# Patient Record
Sex: FEMALE | Race: WHITE | NOT HISPANIC OR LATINO | Employment: FULL TIME | URBAN - METROPOLITAN AREA
[De-identification: names, ages, dates, MRNs, and addresses within clinical notes are randomized per-mention and may not be internally consistent; named-entity substitution may affect disease eponyms.]

---

## 2023-02-19 PROBLEM — F41.9 ANXIETY: Status: ACTIVE | Noted: 2023-02-19

## 2023-02-19 PROBLEM — M79.89 LEG SWELLING: Status: ACTIVE | Noted: 2023-02-19

## 2023-02-19 PROBLEM — B37.31 VAGINAL YEAST INFECTION: Status: ACTIVE | Noted: 2023-02-19

## 2023-02-19 PROBLEM — E66.9 CLASS 1 OBESITY WITH BODY MASS INDEX (BMI) OF 31.0 TO 31.9 IN ADULT: Status: ACTIVE | Noted: 2023-02-19

## 2023-02-19 RX ORDER — SERTRALINE HYDROCHLORIDE 50 MG/1
1 TABLET, FILM COATED ORAL DAILY
COMMUNITY
Start: 2022-06-23 | End: 2023-03-10 | Stop reason: SDUPTHER

## 2023-02-19 RX ORDER — ETONOGESTREL AND ETHINYL ESTRADIOL VAGINAL RING .015; .12 MG/D; MG/D
1 RING VAGINAL
COMMUNITY
Start: 2022-05-26 | End: 2023-10-31

## 2023-02-19 RX ORDER — IBUPROFEN 600 MG/1
TABLET ORAL
COMMUNITY
Start: 2022-01-21 | End: 2023-10-31

## 2023-02-19 RX ORDER — TIRZEPATIDE 2.5 MG/.5ML
2.5 INJECTION, SOLUTION SUBCUTANEOUS
COMMUNITY
End: 2023-03-10

## 2023-03-10 ENCOUNTER — OFFICE VISIT (OUTPATIENT)
Dept: PRIMARY CARE | Facility: CLINIC | Age: 38
End: 2023-03-10
Payer: COMMERCIAL

## 2023-03-10 VITALS
HEIGHT: 69 IN | HEART RATE: 86 BPM | SYSTOLIC BLOOD PRESSURE: 120 MMHG | RESPIRATION RATE: 14 BRPM | DIASTOLIC BLOOD PRESSURE: 88 MMHG | TEMPERATURE: 98.2 F | BODY MASS INDEX: 29.03 KG/M2 | OXYGEN SATURATION: 98 % | WEIGHT: 196 LBS

## 2023-03-10 DIAGNOSIS — R11.0 NAUSEA: ICD-10-CM

## 2023-03-10 DIAGNOSIS — E66.9 CLASS 1 OBESITY WITHOUT SERIOUS COMORBIDITY WITH BODY MASS INDEX (BMI) OF 31.0 TO 31.9 IN ADULT, UNSPECIFIED OBESITY TYPE: ICD-10-CM

## 2023-03-10 DIAGNOSIS — F41.9 ANXIETY: Primary | ICD-10-CM

## 2023-03-10 PROBLEM — B37.31 VAGINAL YEAST INFECTION: Status: RESOLVED | Noted: 2023-02-19 | Resolved: 2023-03-10

## 2023-03-10 PROCEDURE — 99213 OFFICE O/P EST LOW 20 MIN: CPT | Performed by: INTERNAL MEDICINE

## 2023-03-10 PROCEDURE — 1036F TOBACCO NON-USER: CPT | Performed by: INTERNAL MEDICINE

## 2023-03-10 PROCEDURE — 3008F BODY MASS INDEX DOCD: CPT | Performed by: INTERNAL MEDICINE

## 2023-03-10 RX ORDER — ONDANSETRON 4 MG/1
4 TABLET, FILM COATED ORAL EVERY 8 HOURS PRN
Qty: 20 TABLET | Refills: 0 | Status: SHIPPED | OUTPATIENT
Start: 2023-03-10 | End: 2023-03-17

## 2023-03-10 RX ORDER — SERTRALINE HYDROCHLORIDE 50 MG/1
50 TABLET, FILM COATED ORAL DAILY
Qty: 90 TABLET | Refills: 3 | Status: SHIPPED | OUTPATIENT
Start: 2023-03-10 | End: 2023-05-11 | Stop reason: SDUPTHER

## 2023-03-10 ASSESSMENT — LIFESTYLE VARIABLES: HOW OFTEN DO YOU HAVE A DRINK CONTAINING ALCOHOL: MONTHLY OR LESS

## 2023-03-10 NOTE — PATIENT INSTRUCTIONS
She is doing well.   She has  occasional  nausea . She can try  to inhale rubbing alcohol  Otherwise will call in a few zofran to use as needed  Call  with any problems or questions.   Follow up in 6 months

## 2023-03-10 NOTE — PROGRESS NOTES
"Subjective    Jess Ac is a 37 y.o. female who presents for Weight Loss medication follow up.  HPI  Doing well with victoza. C/o nausea couple times a week.   She has lost 20 lbs since January. She is doing well on it. She took a zofran and that helped.     Review of Systems   All other systems reviewed and are negative.        Objective     /88 (BP Location: Left arm, Patient Position: Sitting, BP Cuff Size: Adult)   Pulse 86   Temp 36.8 °C (98.2 °F)   Resp 14   Ht 1.753 m (5' 9\")   Wt 88.9 kg (196 lb)   SpO2 98%   BMI 28.94 kg/m²    Physical Exam  Vitals reviewed.   Constitutional:       General: She is not in acute distress.     Appearance: Normal appearance.   Cardiovascular:      Rate and Rhythm: Normal rate and regular rhythm.      Pulses: Normal pulses.      Heart sounds: Normal heart sounds.   Pulmonary:      Effort: Pulmonary effort is normal.      Breath sounds: Normal breath sounds.   Abdominal:      Tenderness: There is no abdominal tenderness.   Musculoskeletal:         General: No swelling.   Skin:     General: Skin is warm and dry.   Neurological:      Mental Status: She is alert.       Health Maintenance Due   Topic Date Due    Yearly Adult Physical  Never done    Lipid Panel  Never done    HIV Screening  Never done    MMR Vaccines (1 of 1 - Standard series) Never done    Varicella Vaccines (1 of 2 - 2-dose childhood series) Never done    Hepatitis B Vaccines (2 of 3 - 3-dose series) 02/14/2001    Hepatitis C Screening  Never done    Diabetes Screening  Never done    COVID-19 Vaccine (1) Never done    Cervical Cancer Screening  Never done    DTaP/Tdap/Td Vaccines (2 - Td or Tdap) 11/23/2021    Influenza Vaccine (1) 09/01/2022          Assessment/Plan   Problem List Items Addressed This Visit          Endocrine/Metabolic    Class 1 obesity with body mass index (BMI) of 31.0 to 31.9 in adult    Relevant Medications    liraglutide (Victoza) 0.6 mg/0.1 mL (18 mg/3 mL) injection    " Other Relevant Orders    Follow Up In [facility] - PCP       Other    Anxiety - Primary    Relevant Medications    sertraline (Zoloft) 50 mg tablet     Other Visit Diagnoses       Nausea        Relevant Medications    ondansetron (Zofran) 4 mg tablet

## 2023-04-03 DIAGNOSIS — E66.9 CLASS 1 OBESITY WITHOUT SERIOUS COMORBIDITY WITH BODY MASS INDEX (BMI) OF 31.0 TO 31.9 IN ADULT, UNSPECIFIED OBESITY TYPE: ICD-10-CM

## 2023-04-14 DIAGNOSIS — E66.9 CLASS 1 OBESITY WITHOUT SERIOUS COMORBIDITY WITH BODY MASS INDEX (BMI) OF 31.0 TO 31.9 IN ADULT, UNSPECIFIED OBESITY TYPE: ICD-10-CM

## 2023-04-19 ENCOUNTER — TELEPHONE (OUTPATIENT)
Dept: PRIMARY CARE | Facility: CLINIC | Age: 38
End: 2023-04-19
Payer: COMMERCIAL

## 2023-04-19 NOTE — TELEPHONE ENCOUNTER
mariah Kahn from [facility] lm regarding Victoza    She has  insurance and rx is only covered with DM dx.   --    I will inform patient.

## 2023-05-11 ENCOUNTER — TELEPHONE (OUTPATIENT)
Dept: PRIMARY CARE | Facility: CLINIC | Age: 38
End: 2023-05-11
Payer: COMMERCIAL

## 2023-05-11 DIAGNOSIS — F41.9 ANXIETY: ICD-10-CM

## 2023-05-11 RX ORDER — SERTRALINE HYDROCHLORIDE 100 MG/1
150 TABLET, FILM COATED ORAL DAILY
Qty: 270 TABLET | Refills: 0 | Status: SHIPPED | OUTPATIENT
Start: 2023-05-11 | End: 2023-09-29 | Stop reason: SDUPTHER

## 2023-05-11 NOTE — TELEPHONE ENCOUNTER
----- Message from Marni Sepulveda DO sent at 5/11/2023 12:18 PM EDT -----  Regarding: FW: Zoloft   Contact: [phone]  Have her increase to 1.5 tabs (150mg) and see me in 4-6 weeks. Can send her in a new refil  ----- Message -----  From: Lesly Trent CMA  Sent: 5/11/2023  10:27 AM EDT  To: Marni Sepulveda DO  Subject: FW: Zoloft                                         ----- Message -----  From: Jess Ac  Sent: 5/11/2023  10:25 AM EDT  [facility]  Subject: Zoloft                                           Hello,     I would like to increase my Zoloft please. I am currently at 100mg. Please let me know.     Thank you!

## 2023-06-19 PROBLEM — E66.09 OBESITY DUE TO EXCESS CALORIES: Status: ACTIVE | Noted: 2023-06-19

## 2023-06-19 PROBLEM — F41.9 ANXIETY: Status: ACTIVE | Noted: 2023-06-19

## 2023-09-29 DIAGNOSIS — F41.9 ANXIETY: ICD-10-CM

## 2023-09-29 RX ORDER — SERTRALINE HYDROCHLORIDE 100 MG/1
150 TABLET, FILM COATED ORAL DAILY
Qty: 270 TABLET | Refills: 0 | Status: SHIPPED | OUTPATIENT
Start: 2023-09-29 | End: 2023-10-31

## 2023-10-23 ENCOUNTER — APPOINTMENT (OUTPATIENT)
Dept: PRIMARY CARE | Facility: CLINIC | Age: 38
End: 2023-10-23
Payer: COMMERCIAL

## 2023-10-30 ENCOUNTER — TELEPHONE (OUTPATIENT)
Dept: PRIMARY CARE | Facility: CLINIC | Age: 38
End: 2023-10-30
Payer: COMMERCIAL

## 2023-10-30 ENCOUNTER — TELEPHONE (OUTPATIENT)
Dept: OBSTETRICS AND GYNECOLOGY | Facility: CLINIC | Age: 38
End: 2023-10-30

## 2023-10-30 DIAGNOSIS — Z34.90 PREGNANCY, UNSPECIFIED GESTATIONAL AGE (HHS-HCC): Primary | ICD-10-CM

## 2023-10-30 DIAGNOSIS — O36.80X0 PREGNANCY OF UNKNOWN ANATOMIC LOCATION (HHS-HCC): Primary | ICD-10-CM

## 2023-10-30 DIAGNOSIS — Z87.59 HISTORY OF MISCARRIAGE: ICD-10-CM

## 2023-10-30 NOTE — TELEPHONE ENCOUNTER
NOB called ob line stating that she has a history of 6 prior miscarriages and with her last pregnancy she was started on progesterone suppositories.  She was advised to call to notify her OB of +upt so that she could be started on progesterone ASAP.  Due to patients insurance change she can no longer see her prior ob at [facility] and is hoping that she can have the progesterone suppositories prescribed ASAP so that she doesn't have another miscarriage.  Patient advised that we cannot prescribe anything until she has established here as a patient.  Patient offered an earlier appointment with Gloria tomorrow at 1 pm in [place], which she accepted.  Message forwarded to Gloria to make her aware

## 2023-10-31 ENCOUNTER — OFFICE VISIT (OUTPATIENT)
Dept: OBSTETRICS AND GYNECOLOGY | Facility: CLINIC | Age: 38
End: 2023-10-31
Payer: COMMERCIAL

## 2023-10-31 VITALS
BODY MASS INDEX: 26.27 KG/M2 | HEIGHT: 69 IN | WEIGHT: 177.38 LBS | DIASTOLIC BLOOD PRESSURE: 70 MMHG | SYSTOLIC BLOOD PRESSURE: 108 MMHG

## 2023-10-31 DIAGNOSIS — Z34.90 PREGNANCY, UNSPECIFIED GESTATIONAL AGE (HHS-HCC): Primary | ICD-10-CM

## 2023-10-31 DIAGNOSIS — Z87.59 HISTORY OF MISCARRIAGE: ICD-10-CM

## 2023-10-31 PROBLEM — F41.9 ANXIETY: Status: ACTIVE | Noted: 2023-02-19

## 2023-10-31 LAB — B-HCG SERPL-ACNC: 56 MIU/ML

## 2023-10-31 PROCEDURE — 1036F TOBACCO NON-USER: CPT | Performed by: ADVANCED PRACTICE MIDWIFE

## 2023-10-31 PROCEDURE — 84702 CHORIONIC GONADOTROPIN TEST: CPT

## 2023-10-31 PROCEDURE — 36415 COLL VENOUS BLD VENIPUNCTURE: CPT

## 2023-10-31 PROCEDURE — 99203 OFFICE O/P NEW LOW 30 MIN: CPT | Performed by: ADVANCED PRACTICE MIDWIFE

## 2023-10-31 PROCEDURE — 3008F BODY MASS INDEX DOCD: CPT | Performed by: ADVANCED PRACTICE MIDWIFE

## 2023-10-31 RX ORDER — PROGESTERONE 200 MG/1
CAPSULE ORAL
COMMUNITY
Start: 2023-10-31 | End: 2023-10-31

## 2023-10-31 RX ORDER — PROGESTERONE 200 MG/1
200 CAPSULE ORAL NIGHTLY
Qty: 30 CAPSULE | Refills: 2 | Status: SHIPPED | OUTPATIENT
Start: 2023-10-31 | End: 2023-12-07 | Stop reason: ALTCHOICE

## 2023-10-31 RX ORDER — ASPIRIN 81 MG/1
81 TABLET ORAL DAILY
COMMUNITY
End: 2023-12-07 | Stop reason: ALTCHOICE

## 2023-10-31 ASSESSMENT — ENCOUNTER SYMPTOMS
RESPIRATORY NEGATIVE: 0
MUSCULOSKELETAL NEGATIVE: 0
PSYCHIATRIC NEGATIVE: 0
HEMATOLOGIC/LYMPHATIC NEGATIVE: 0
CARDIOVASCULAR NEGATIVE: 0
NEUROLOGICAL NEGATIVE: 0
CONSTITUTIONAL NEGATIVE: 0
EYES NEGATIVE: 0
ENDOCRINE NEGATIVE: 0
GASTROINTESTINAL NEGATIVE: 0
ALLERGIC/IMMUNOLOGIC NEGATIVE: 0

## 2023-10-31 NOTE — PROGRESS NOTES
"Subjective   Patient ID: Jess Ac is a 37 y.o. female who presents to establish care and seek rx for progesterone PV for hx of pregnancy loss. Feeling well today, and optimistic.  LMP 9/29 and had spotting 10/24 and 10/25, which was not consistent with her general bleeding pattern. 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  Living with , step son and daughter - mom is moving in with them soon.     Review of Systems   All other systems reviewed and are negative.      Objective   Physical Exam  Vitals reviewed.   Constitutional:       Appearance: Normal appearance.   HENT:      Head: Normocephalic and atraumatic.   Cardiovascular:      Rate and Rhythm: Normal rate.   Pulmonary:      Effort: Pulmonary effort is normal.   Skin:     General: Skin is warm and dry.   Neurological:      Mental Status: She is alert and oriented to person, place, and time.   Psychiatric:         Mood and Affect: Mood normal.         Behavior: Behavior normal.         Assessment/Plan   Problem List Items Addressed This Visit    None  Visit Diagnoses         Codes    Pregnancy, unspecified gestational age     Z34.90    Relevant Orders    US MAC OB imaging order    POCT pregnancy, urine manually resulted    History of miscarriage     Z87.59    Relevant Medications    progesterone (Prometrium) 200 mg capsule    Other Relevant Orders    US MAC OB imaging order    hCG, quantitative (Completed)    US MAC OB imaging order    POCT pregnancy, urine manually resulted          Patient desires supplemental vaginal progesterone therapy in the setting of repeated pregnancy loss: discussed that this therapy is not supported by data, and that trials of patients with recurrent pregnancy loss showed no differences between the groups in the rates of clinical " pregnancy at six to eight weeks, ongoing pregnancy at 12 weeks, ectopic pregnancy, miscarriage, or stillbirth. There were also no differences in  outcomes.  Quants ordered.  Early ultrasound ordered - will do after 6-7 weeks.  Continue ASA, PNV and start PV progesterone now.   All questions answered.     DESEAN Jason-TAMMY

## 2023-11-02 ENCOUNTER — APPOINTMENT (OUTPATIENT)
Dept: OBSTETRICS AND GYNECOLOGY | Facility: CLINIC | Age: 38
End: 2023-11-02
Payer: COMMERCIAL

## 2023-11-02 ENCOUNTER — LAB (OUTPATIENT)
Dept: LAB | Facility: LAB | Age: 38
End: 2023-11-02
Payer: COMMERCIAL

## 2023-11-02 DIAGNOSIS — Z87.59 HISTORY OF MISCARRIAGE: ICD-10-CM

## 2023-11-02 DIAGNOSIS — Z34.90 PREGNANCY, UNSPECIFIED GESTATIONAL AGE (HHS-HCC): ICD-10-CM

## 2023-11-02 LAB — B-HCG SERPL-ACNC: 163 MIU/ML

## 2023-11-02 PROCEDURE — 84702 CHORIONIC GONADOTROPIN TEST: CPT

## 2023-11-02 PROCEDURE — 36415 COLL VENOUS BLD VENIPUNCTURE: CPT

## 2023-11-06 ENCOUNTER — APPOINTMENT (OUTPATIENT)
Dept: OBSTETRICS AND GYNECOLOGY | Facility: CLINIC | Age: 38
End: 2023-11-06
Payer: COMMERCIAL

## 2023-11-06 NOTE — TELEPHONE ENCOUNTER
"5.3 wk ob called OB line c/o brown spotting when wiping since Saturday.  It started as a light pink discoloration on the toilet paper when wiping on Saturday morning.  Patient had a very active day, and the spotting seemed to  and was brown in color throughout the day with little \"specs\" of possible clots vs tissue.  Had intercourse Saturday night, continued to have brown spotting all day Sunday, again only when wiping. Today the spotting is still brown in color and seems to be lessoning.  Patient denies cramping or pain.  Due to h/o 6 miscarriages, Gloria started her on Progesterone, which she has been taking since end of October.  BHCG 56 on 10/31 & 163 on 11/3.   Patient is wondering is she can or should have repeat BHCG drawn again being that she is having spotting?  With her history she is very nervous about another potential miscarriage.  Patient is wondering if she is having a miscarriage and is using progesterone will she still be able to pass everything while on progesterone?  Patient assured that progesterone is taken by women with a strong history of recurrent miscarrages to help keep you pregnant but will not prevent a miscarriage from happening.  Patient assured that spotting in first part of pregnancy is not uncommon.  Spotting after intercourse is also not uncommon.  For now patient advised to monitor her spotting and call if she starts bleeding like a period and/or having intense cramping/pain.      Message forwarded to Emanuel being that Gloria is out of the office on Monday's.  Please advise if repeat BHCG's are recommended.  "

## 2023-11-07 DIAGNOSIS — O09.299 HISTORY OF MISCARRIAGE, CURRENTLY PREGNANT (HHS-HCC): ICD-10-CM

## 2023-11-07 DIAGNOSIS — O36.80X0 PREGNANCY WITH UNCERTAIN FETAL VIABILITY, SINGLE OR UNSPECIFIED FETUS (HHS-HCC): ICD-10-CM

## 2023-11-07 DIAGNOSIS — O26.851 SPOTTING AFFECTING PREGNANCY IN FIRST TRIMESTER (HHS-HCC): ICD-10-CM

## 2023-11-07 NOTE — TELEPHONE ENCOUNTER
HCGs were previously increasing appropriately.  At this time point I would recommend an ultrasound for this week.

## 2023-11-08 ENCOUNTER — ANCILLARY PROCEDURE (OUTPATIENT)
Dept: RADIOLOGY | Facility: CLINIC | Age: 38
End: 2023-11-08
Payer: COMMERCIAL

## 2023-11-08 ENCOUNTER — LAB (OUTPATIENT)
Dept: LAB | Facility: LAB | Age: 38
End: 2023-11-08
Payer: COMMERCIAL

## 2023-11-08 DIAGNOSIS — O36.80X0 PREGNANCY WITH UNCERTAIN FETAL VIABILITY, SINGLE OR UNSPECIFIED FETUS (HHS-HCC): ICD-10-CM

## 2023-11-08 DIAGNOSIS — O09.299 HISTORY OF MISCARRIAGE, CURRENTLY PREGNANT (HHS-HCC): ICD-10-CM

## 2023-11-08 DIAGNOSIS — O36.80X0 PREGNANCY OF UNKNOWN ANATOMIC LOCATION (HHS-HCC): ICD-10-CM

## 2023-11-08 DIAGNOSIS — O26.851 SPOTTING AFFECTING PREGNANCY IN FIRST TRIMESTER (HHS-HCC): ICD-10-CM

## 2023-11-08 LAB — B-HCG SERPL-ACNC: 152 MIU/ML

## 2023-11-08 PROCEDURE — 76817 TRANSVAGINAL US OBSTETRIC: CPT | Performed by: OBSTETRICS & GYNECOLOGY

## 2023-11-08 PROCEDURE — 76801 OB US < 14 WKS SINGLE FETUS: CPT

## 2023-11-08 PROCEDURE — 76817 TRANSVAGINAL US OBSTETRIC: CPT

## 2023-11-08 PROCEDURE — 36415 COLL VENOUS BLD VENIPUNCTURE: CPT

## 2023-11-08 PROCEDURE — 76815 OB US LIMITED FETUS(S): CPT | Performed by: OBSTETRICS & GYNECOLOGY

## 2023-11-08 PROCEDURE — 84702 CHORIONIC GONADOTROPIN TEST: CPT

## 2023-11-08 NOTE — TELEPHONE ENCOUNTER
Patient had her early viability ultrasound done today due to h/o multiple MAB's and spotting.  Ultrasound tech was confused as to why the patient was there so early, being that she is only 5.5 wks today and is too early to see fetal structures on ultrasound.  Ultrasound was done and it didn't show any fetal structures.  Patient was advised to call obgyn and get set up for serial BHG's.  Patient states her spotting is now super light.  She has been taking UPT's almost every day and they still show very positive.  Patient is requesting a lab order to have BHCG drawn today and then again in 48 hours.  Please advise

## 2023-11-08 NOTE — TELEPHONE ENCOUNTER
Ultrasound reviewed  No gestational sac located, continue to monitor  bHCG x 2 ordered  Keep scheduled follow up ultrasound unless instructed otherwise  Provide bleeding/SAB precautions

## 2023-11-20 ENCOUNTER — APPOINTMENT (OUTPATIENT)
Dept: RADIOLOGY | Facility: CLINIC | Age: 38
End: 2023-11-20
Payer: COMMERCIAL

## 2023-11-20 ENCOUNTER — TELEPHONE (OUTPATIENT)
Dept: OBSTETRICS AND GYNECOLOGY | Facility: CLINIC | Age: 38
End: 2023-11-20
Payer: COMMERCIAL

## 2023-11-20 NOTE — TELEPHONE ENCOUNTER
I called this patient today. There was no answer. I left a message to call the office back.    I do not see a follow-up beta-hCG level.  We need to evaluate this to make sure she does not have an ectopic pregnancy and based on her lab results would need possibly another ultrasound.    Jaquelin Magaña MD

## 2023-11-29 ENCOUNTER — TELEPHONE (OUTPATIENT)
Dept: OBSTETRICS AND GYNECOLOGY | Facility: CLINIC | Age: 38
End: 2023-11-29

## 2023-11-29 NOTE — TELEPHONE ENCOUNTER
Left message for patient to call back to follow up from her 10/31 appointment with TEMO. I did inform her that there are follow up labs if needed. Bhcg needs drawn prior to 12/7 [facility] appointment/ needs follow up ultrasound per KD.

## 2023-11-29 NOTE — TELEPHONE ENCOUNTER
Patient states she started having bleeding, passing clots/tissue right before Thanksgiving.  She spoke with Emanuel and had been advised to have another BHCG drawn but she hasn't had a moment to go. Patient is planning on going tomorrow for repeat BHCG.  She did take another  UPT today still showing faint +.  Patient states she is still having light bleeding/spotting, passing an occasional small clot.  Denies cramping or pain throughout this entire process.  Per patient, she has had 6 prior miscarriages and is unfortunately very familiar with the miscarriage process.  Patient feels that everything has passed.       Patient advised to have repeat BHCG drawn tomorrow so we can make sure it goes back down to a negative result.  Patient agreed.      Message forwarded to Amelia, being that Emanuel is out today

## 2023-11-30 ENCOUNTER — LAB (OUTPATIENT)
Dept: LAB | Facility: LAB | Age: 38
End: 2023-11-30
Payer: COMMERCIAL

## 2023-11-30 DIAGNOSIS — O36.80X0 PREGNANCY OF UNKNOWN ANATOMIC LOCATION (HHS-HCC): ICD-10-CM

## 2023-11-30 LAB — B-HCG SERPL-ACNC: 9 MIU/ML

## 2023-11-30 PROCEDURE — 84702 CHORIONIC GONADOTROPIN TEST: CPT

## 2023-11-30 PROCEDURE — 36415 COLL VENOUS BLD VENIPUNCTURE: CPT

## 2023-12-07 ENCOUNTER — OFFICE VISIT (OUTPATIENT)
Dept: OBSTETRICS AND GYNECOLOGY | Facility: CLINIC | Age: 38
End: 2023-12-07
Payer: COMMERCIAL

## 2023-12-07 VITALS
BODY MASS INDEX: 28.08 KG/M2 | SYSTOLIC BLOOD PRESSURE: 108 MMHG | DIASTOLIC BLOOD PRESSURE: 74 MMHG | WEIGHT: 187.38 LBS

## 2023-12-07 DIAGNOSIS — N96 HISTORY OF RECURRENT MISCARRIAGES: Primary | ICD-10-CM

## 2023-12-07 LAB — PREGNANCY TEST URINE, POC: NEGATIVE

## 2023-12-07 PROCEDURE — 1036F TOBACCO NON-USER: CPT

## 2023-12-07 PROCEDURE — 3008F BODY MASS INDEX DOCD: CPT

## 2023-12-07 PROCEDURE — 99213 OFFICE O/P EST LOW 20 MIN: CPT

## 2023-12-07 PROCEDURE — 81025 URINE PREGNANCY TEST: CPT

## 2023-12-07 NOTE — PROGRESS NOTES
"Subjective   Patient ID: Jess Ac is a 37 y.o. female who presents for Establish Care (Last pap 2020 nml hpv-/Colpo: /Would like to conceive./Bled for 4 weeks after MAB, not currently bleeding. /No abdominal pain.).    HPI  Patient presents to the office today to establish care within the practice.  Is working for  remotely for the infusion center.  Previously worked at [employer].  Recently bought and moving into a new house.  Had a recent miscarriage last month, HCG levels noted in patient chart.  No longer bleeding.  She is a  -- States that she had one miscarriage with her ex-, and 6 total miscarriages with her current .  Has one living child that was born vaginally that is 2 years old, \"Tiarra.\"  Is not on birth control, desires another pregnancy.  Wants to note that when she was pregnant with Tiarra, she had a positive UPT 8 days before her missed period and started taking vaginal progesterone suppositories right away.      Menstrual cycles are regular; she tracks them monthly on an kaveh on her phone.     Review of Systems   All other systems reviewed and are negative.      Objective   Physical Exam  Constitutional:       Appearance: Normal appearance.   HENT:      Head: Normocephalic.   Pulmonary:      Effort: Pulmonary effort is normal.   Skin:     General: Skin is warm and dry.   Neurological:      Mental Status: She is alert and oriented to person, place, and time.   Psychiatric:         Mood and Affect: Mood normal.         Assessment/Plan   Diagnoses and all orders for this visit:  History of recurrent miscarriages  -     Referral to Prenatal Genetics; Future  -     Referral to Reproductive Endocrinology; Future  -     POC pregnancy, urine    UPT in office negative today after recent miscarriage; no longer bleeding.   Referrals provided and patient encouraged to schedule.    Patient reports understanding, all questions answered.   F/U as needed.     Amelia Ospina" DESEAN Farias-TAMMY 12/07/23 8:52 AM

## 2023-12-11 DIAGNOSIS — E04.1 THYROID NODULE: Primary | ICD-10-CM

## 2023-12-12 ENCOUNTER — OFFICE VISIT (OUTPATIENT)
Dept: PRIMARY CARE | Facility: CLINIC | Age: 38
End: 2023-12-12
Payer: COMMERCIAL

## 2023-12-12 VITALS
RESPIRATION RATE: 14 BRPM | BODY MASS INDEX: 27.99 KG/M2 | HEART RATE: 62 BPM | DIASTOLIC BLOOD PRESSURE: 68 MMHG | HEIGHT: 69 IN | WEIGHT: 189 LBS | TEMPERATURE: 97.2 F | OXYGEN SATURATION: 98 % | SYSTOLIC BLOOD PRESSURE: 120 MMHG

## 2023-12-12 DIAGNOSIS — F41.9 ANXIETY: Primary | ICD-10-CM

## 2023-12-12 DIAGNOSIS — E01.0 THYROMEGALY: ICD-10-CM

## 2023-12-12 DIAGNOSIS — Z00.00 WELLNESS EXAMINATION: ICD-10-CM

## 2023-12-12 PROBLEM — E66.9 CLASS 1 OBESITY WITH BODY MASS INDEX (BMI) OF 31.0 TO 31.9 IN ADULT: Status: RESOLVED | Noted: 2023-02-19 | Resolved: 2023-12-12

## 2023-12-12 PROBLEM — M79.89 LEG SWELLING: Status: RESOLVED | Noted: 2023-02-19 | Resolved: 2023-12-12

## 2023-12-12 PROBLEM — E66.09 OBESITY DUE TO EXCESS CALORIES: Status: RESOLVED | Noted: 2023-06-19 | Resolved: 2023-12-12

## 2023-12-12 PROCEDURE — 99395 PREV VISIT EST AGE 18-39: CPT | Performed by: INTERNAL MEDICINE

## 2023-12-12 PROCEDURE — 3008F BODY MASS INDEX DOCD: CPT | Performed by: INTERNAL MEDICINE

## 2023-12-12 PROCEDURE — 1036F TOBACCO NON-USER: CPT | Performed by: INTERNAL MEDICINE

## 2023-12-12 RX ORDER — SERTRALINE HYDROCHLORIDE 100 MG/1
150 TABLET, FILM COATED ORAL DAILY
Qty: 135 TABLET | Refills: 3 | Status: SHIPPED | OUTPATIENT
Start: 2023-12-12 | End: 2024-12-11

## 2023-12-12 RX ORDER — SERTRALINE HYDROCHLORIDE 100 MG/1
100 TABLET, FILM COATED ORAL DAILY
COMMUNITY
End: 2023-12-12 | Stop reason: SDUPTHER

## 2023-12-12 NOTE — PROGRESS NOTES
"Clarissa Ac is a 37 y.o. female who presents for Anxiety.  HPI  Follow up anxiety   Recent miscarriage   She is feeling well.  The sertraline works well for her.       Review of Systems   All other systems reviewed and are negative.        Objective     /68 (BP Location: Left arm, Patient Position: Sitting, BP Cuff Size: Adult)   Pulse 62   Temp 36.2 °C (97.2 °F) (Skin)   Resp 14   Ht 1.74 m (5' 8.5\")   Wt 85.7 kg (189 lb)   SpO2 98%   BMI 28.32 kg/m²    Physical Exam  Constitutional:       Appearance: Normal appearance.   Neck:      Thyroid: Thyromegaly present.   Cardiovascular:      Rate and Rhythm: Normal rate and regular rhythm.      Pulses: Normal pulses.   Pulmonary:      Effort: Pulmonary effort is normal.      Breath sounds: Normal breath sounds.   Chest:      Chest wall: No mass or tenderness.   Breasts:     Right: Normal.      Left: Normal.   Abdominal:      General: Abdomen is flat.   Musculoskeletal:      Cervical back: Neck supple. No tenderness.   Lymphadenopathy:      Cervical: No cervical adenopathy.      Upper Body:      Right upper body: No supraclavicular or axillary adenopathy.      Left upper body: No supraclavicular or axillary adenopathy.   Neurological:      Mental Status: She is alert.   Psychiatric:         Attention and Perception: Attention and perception normal.         Mood and Affect: Mood and affect normal.       Health Maintenance Due   Topic Date Due    Yearly Adult Physical  Never done    Lipid Panel  Never done    HIV Screening  Never done    COVID-19 Vaccine (1) Never done    Varicella Vaccines (1 of 2 - 2-dose childhood series) Never done    Hepatitis C Screening  Never done    HPV Vaccines (2 - 3-dose series) 08/19/2009    Cervical Cancer Screening  01/08/2023    Influenza Vaccine (1) 09/01/2023    Diabetes Screening  11/05/2023          Assessment/Plan   Problem List Items Addressed This Visit       Anxiety - Primary    Relevant Medications "    sertraline (Zoloft) 100 mg tablet     Other Visit Diagnoses       Wellness examination        Relevant Orders    CBC    Comprehensive Metabolic Panel    Lipid Panel    Thyromegaly        Relevant Orders    TSH with reflex to Free T4 if abnormal    US thyroid        Call  with any problems or questions.   Follow up in a year or sooner if needed.

## 2023-12-14 ENCOUNTER — HOSPITAL ENCOUNTER (OUTPATIENT)
Dept: RADIOLOGY | Facility: HOSPITAL | Age: 38
Discharge: HOME | End: 2023-12-14
Payer: COMMERCIAL

## 2023-12-14 ENCOUNTER — APPOINTMENT (OUTPATIENT)
Dept: RADIOLOGY | Facility: CLINIC | Age: 38
End: 2023-12-14
Payer: COMMERCIAL

## 2023-12-14 ENCOUNTER — LAB (OUTPATIENT)
Dept: LAB | Facility: LAB | Age: 38
End: 2023-12-14
Payer: COMMERCIAL

## 2023-12-14 DIAGNOSIS — E01.0 THYROMEGALY: ICD-10-CM

## 2023-12-14 DIAGNOSIS — Z00.00 WELLNESS EXAMINATION: ICD-10-CM

## 2023-12-14 LAB
ALBUMIN SERPL BCP-MCNC: 3.6 G/DL (ref 3.4–5)
ALP SERPL-CCNC: 49 U/L (ref 33–110)
ALT SERPL W P-5'-P-CCNC: 10 U/L (ref 7–45)
ANION GAP SERPL CALC-SCNC: 12 MMOL/L (ref 10–20)
AST SERPL W P-5'-P-CCNC: 11 U/L (ref 9–39)
BILIRUB SERPL-MCNC: 0.5 MG/DL (ref 0–1.2)
BUN SERPL-MCNC: 12 MG/DL (ref 6–23)
CALCIUM SERPL-MCNC: 8.3 MG/DL (ref 8.6–10.3)
CHLORIDE SERPL-SCNC: 106 MMOL/L (ref 98–107)
CHOLEST SERPL-MCNC: 151 MG/DL (ref 0–199)
CHOLESTEROL/HDL RATIO: 3.6
CO2 SERPL-SCNC: 30 MMOL/L (ref 21–32)
CREAT SERPL-MCNC: 0.79 MG/DL (ref 0.5–1.05)
ERYTHROCYTE [DISTWIDTH] IN BLOOD BY AUTOMATED COUNT: 12.7 % (ref 11.5–14.5)
GFR SERPL CREATININE-BSD FRML MDRD: >90 ML/MIN/1.73M*2
GLUCOSE SERPL-MCNC: 70 MG/DL (ref 74–99)
HCT VFR BLD AUTO: 49.4 % (ref 36–46)
HDLC SERPL-MCNC: 42.4 MG/DL
HGB BLD-MCNC: 16 G/DL (ref 12–16)
LDLC SERPL CALC-MCNC: 79 MG/DL
MCH RBC QN AUTO: 30.3 PG (ref 26–34)
MCHC RBC AUTO-ENTMCNC: 32.4 G/DL (ref 32–36)
MCV RBC AUTO: 94 FL (ref 80–100)
NON HDL CHOLESTEROL: 109 MG/DL (ref 0–149)
NRBC BLD-RTO: 0 /100 WBCS (ref 0–0)
PLATELET # BLD AUTO: 308 X10*3/UL (ref 150–450)
POTASSIUM SERPL-SCNC: 4 MMOL/L (ref 3.5–5.3)
PROT SERPL-MCNC: 5.3 G/DL (ref 6.4–8.2)
RBC # BLD AUTO: 5.28 X10*6/UL (ref 4–5.2)
SODIUM SERPL-SCNC: 144 MMOL/L (ref 136–145)
TRIGL SERPL-MCNC: 150 MG/DL (ref 0–149)
TSH SERPL-ACNC: 2.62 MIU/L (ref 0.44–3.98)
VLDL: 30 MG/DL (ref 0–40)
WBC # BLD AUTO: 8.4 X10*3/UL (ref 4.4–11.3)

## 2023-12-14 PROCEDURE — 76536 US EXAM OF HEAD AND NECK: CPT

## 2023-12-14 PROCEDURE — 76536 US EXAM OF HEAD AND NECK: CPT | Performed by: RADIOLOGY

## 2023-12-14 PROCEDURE — 84443 ASSAY THYROID STIM HORMONE: CPT

## 2023-12-14 PROCEDURE — 80053 COMPREHEN METABOLIC PANEL: CPT

## 2023-12-14 PROCEDURE — 85027 COMPLETE CBC AUTOMATED: CPT

## 2023-12-14 PROCEDURE — 36415 COLL VENOUS BLD VENIPUNCTURE: CPT

## 2023-12-14 PROCEDURE — 80061 LIPID PANEL: CPT

## 2024-01-29 ENCOUNTER — OFFICE VISIT (OUTPATIENT)
Dept: OTOLARYNGOLOGY | Facility: CLINIC | Age: 39
End: 2024-01-29
Payer: COMMERCIAL

## 2024-01-29 VITALS
HEIGHT: 69 IN | DIASTOLIC BLOOD PRESSURE: 77 MMHG | BODY MASS INDEX: 29.33 KG/M2 | WEIGHT: 198 LBS | SYSTOLIC BLOOD PRESSURE: 115 MMHG | TEMPERATURE: 98.4 F

## 2024-01-29 DIAGNOSIS — E04.1 THYROID NODULE: ICD-10-CM

## 2024-01-29 PROCEDURE — 99204 OFFICE O/P NEW MOD 45 MIN: CPT | Performed by: OTOLARYNGOLOGY

## 2024-01-29 PROCEDURE — 3008F BODY MASS INDEX DOCD: CPT | Performed by: OTOLARYNGOLOGY

## 2024-01-29 PROCEDURE — 1036F TOBACCO NON-USER: CPT | Performed by: OTOLARYNGOLOGY

## 2024-01-30 NOTE — PROGRESS NOTES
ENT Outpatient Consultation    Chief Complaint: Thyroid nodule  History Of Present Illness  Jess Ac is a 38 y.o. female referred by Dr. Sudheer Sepulveda for evaluation of thyroid nodules.  Patient states an enlarged thyroid gland was noted during a physical exam.  A follow-up ultrasound was obtained showing a 3 cm left-sided TI-RADS 3 nodule and a 1.8 cm TI-RADS 4 nodule in the isthmus.  Her thyroid function is normal.  She denies family history of thyroid cancer     Past Medical History  She has a past medical history of Anxiety, History of recurrent miscarriages, and Seasonal allergies.    Surgical History  She has a past surgical history that includes Tonsillectomy (2006).     Social History  She reports that she has quit smoking. Her smoking use included cigarettes. She has never used smokeless tobacco. She reports that she does not currently use alcohol. She reports that she does not use drugs.    Family History  Family History   Problem Relation Name Age of Onset    Heart attack Mother      Seizures Father      Alcohol abuse Father      Aortic aneurysm Father      No Known Problems Other          Allergies  Oxycodone-acetaminophen and Iodinated contrast media     Physical Exam:  CONSTITUTIONAL:  No acute distress  VOICE:  No hoarseness or other abnormality  RESPIRATION:  Breathing comfortably, no stridor  CV:  No clubbing/cyanosis/edema in hands  EYES:  EOM intact, sclera normal  NEURO:  Alert and oriented times 3, Cranial nerves II-XII grossly intact and symmetric bilaterally  HEAD AND FACE:  Symmetric facial features, no masses or lesions, sinuses non-tender to palpation  SALIVARY GLANDS:  Parotid and submandibular glands normal bilaterally  EARS:  Normal external ears, external auditory canals, and TMs to otoscopy, normal hearing to whispered voice.  NOSE:  External nose midline, anterior rhinoscopy is normal with limited visualization to the anterior aspect of the interior turbinates, no bleeding or  "drainage, no lesions  ORAL CAVITY/OROPHARYNX/LIPS:  Normal mucous membranes, normal floor of mouth/tongue/OP, no masses or lesions  PHARYNGEAL WALLS:  No masses or lesions  NECK/LYMPH:  No LAD, isthmus nodule is palpable however I have difficulty palpating the left-sided thyroid nodule, trachea midline  SKIN:  Neck skin is without scar or injury  PSYCH:  Alert and oriented with appropriate mood and affect     Last Recorded Vitals  Blood pressure 115/77, temperature 36.9 °C (98.4 °F), height 1.753 m (5' 9\"), weight 89.8 kg (198 lb).    Relevant Results  Ultrasound reviewed in detail    Assessment and Plan  38 y.o. female with thyroid nodules including a 1.8 cm TI-RADS 4 nodule and a 3 cm TI-RADS 3 nodule.  We discussed indications for needle biopsy and I would recommend needle biopsy of both of these lesions.  We discussed management of nodules in general including indications for surveillance and surgery.  She will follow-up after these biopsies are complete    Glenn Webb MD    "

## 2024-02-05 ENCOUNTER — APPOINTMENT (OUTPATIENT)
Dept: OTOLARYNGOLOGY | Facility: CLINIC | Age: 39
End: 2024-02-05
Payer: COMMERCIAL

## 2024-02-07 ENCOUNTER — HOSPITAL ENCOUNTER (OUTPATIENT)
Dept: RADIOLOGY | Facility: HOSPITAL | Age: 39
Discharge: HOME | End: 2024-02-07
Payer: COMMERCIAL

## 2024-02-07 VITALS
RESPIRATION RATE: 19 BRPM | OXYGEN SATURATION: 100 % | SYSTOLIC BLOOD PRESSURE: 114 MMHG | DIASTOLIC BLOOD PRESSURE: 80 MMHG | HEART RATE: 70 BPM

## 2024-02-07 DIAGNOSIS — E04.1 THYROID NODULE: ICD-10-CM

## 2024-02-07 PROCEDURE — 88112 CYTOPATH CELL ENHANCE TECH: CPT | Mod: TC,59 | Performed by: OTOLARYNGOLOGY

## 2024-02-07 PROCEDURE — 88173 CYTOPATH EVAL FNA REPORT: CPT | Performed by: PATHOLOGY

## 2024-02-07 PROCEDURE — 76942 ECHO GUIDE FOR BIOPSY: CPT

## 2024-02-07 PROCEDURE — 10005 FNA BX W/US GDN 1ST LES: CPT

## 2024-02-07 PROCEDURE — 10006 FNA BX W/US GDN EA ADDL: CPT

## 2024-02-07 ASSESSMENT — PAIN SCALES - GENERAL
PAINLEVEL_OUTOF10: 0 - NO PAIN

## 2024-02-07 NOTE — Clinical Note
Thyroid biopsies complete. Samples sent to lab. Site without bleeding or hematoma. Dressed with band aids and ice pack. Pt tolerated well. Denies pain, dysphagia or facial numbness. Will continue to monitor.

## 2024-02-07 NOTE — POST-PROCEDURE NOTE
Interventional Radiology Brief Postprocedure Note    Procedure: Left and isthmus thyroid nodule biopsy    Provider: DESEAN Muir-CNP    Assistant: None    Diagnosis: Left and isthmus thyroid nodules    Description of procedure: Using ultrasound guidance a total of 3 passes were made with 25 gauge spinal needle to the left and isthmust thyroid nodules. Scanning after each pass demonstrated no evidence of significant postprocedure bleeding. Diagnostic specimen sent to lab for analysis. Please refer to the full radiology report for further details.         Medications (Filter: Administrations occurring from 1116 to 1148 on 02/07/24)      None            Complications: None    Estimated Blood Loss: none        See detailed result report with images in PACS.    The patient tolerated the procedure well without incident or complication and is in stable condition.

## 2024-02-07 NOTE — Clinical Note
Bilat biopsies sites d/I. Pt denies pain, dysphagia or facial numbness. Discharge instructions given. Pt verbalizes understanding. Dischargede pt to waiting room.

## 2024-02-09 LAB
LABORATORY COMMENT REPORT: NORMAL
LABORATORY COMMENT REPORT: NORMAL
PATH REPORT.FINAL DX SPEC: NORMAL
PATH REPORT.GROSS SPEC: NORMAL
PATH REPORT.RELEVANT HX SPEC: NORMAL
PATH REPORT.TOTAL CANCER: NORMAL

## 2024-02-20 DIAGNOSIS — E04.1 THYROID NODULE: Primary | ICD-10-CM

## 2024-02-20 NOTE — RESULT ENCOUNTER NOTE
Reviewed results as well as recommendations for follow-up ultrasound in 6 months.  Order placed in the system.  She will schedule ultrasound and follow-up with me afterwards

## 2024-02-27 ENCOUNTER — APPOINTMENT (OUTPATIENT)
Dept: PRIMARY CARE | Facility: CLINIC | Age: 39
End: 2024-02-27
Payer: COMMERCIAL

## 2024-07-01 ENCOUNTER — APPOINTMENT (OUTPATIENT)
Dept: ENDOCRINOLOGY | Facility: CLINIC | Age: 39
End: 2024-07-01
Payer: COMMERCIAL